# Patient Record
Sex: FEMALE | Race: WHITE | NOT HISPANIC OR LATINO | Employment: UNEMPLOYED | URBAN - METROPOLITAN AREA
[De-identification: names, ages, dates, MRNs, and addresses within clinical notes are randomized per-mention and may not be internally consistent; named-entity substitution may affect disease eponyms.]

---
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
## 2022-04-25 ENCOUNTER — GYNECOLOGY VISIT (OUTPATIENT)
Dept: OBGYN | Facility: CLINIC | Age: 22
End: 2022-04-25
Payer: COMMERCIAL

## 2022-04-25 VITALS
BODY MASS INDEX: 20.09 KG/M2 | HEIGHT: 64 IN | SYSTOLIC BLOOD PRESSURE: 92 MMHG | DIASTOLIC BLOOD PRESSURE: 60 MMHG | WEIGHT: 117.7 LBS

## 2022-04-25 DIAGNOSIS — N93.8 DUB (DYSFUNCTIONAL UTERINE BLEEDING): ICD-10-CM

## 2022-04-25 DIAGNOSIS — F12.90 MARIJUANA USE: ICD-10-CM

## 2022-04-25 DIAGNOSIS — O99.331 TOBACCO USE AFFECTING PREGNANCY IN FIRST TRIMESTER, ANTEPARTUM: ICD-10-CM

## 2022-04-25 PROBLEM — Z34.02 SUPERVISION OF NORMAL FIRST PREGNANCY IN SECOND TRIMESTER: Status: RESOLVED | Noted: 2020-06-10 | Resolved: 2022-04-25

## 2022-04-25 PROCEDURE — 99213 OFFICE O/P EST LOW 20 MIN: CPT | Performed by: PHYSICIAN ASSISTANT

## 2022-04-25 PROCEDURE — 76805 OB US >/= 14 WKS SNGL FETUS: CPT | Performed by: PHYSICIAN ASSISTANT

## 2022-04-25 RX ORDER — PNV NO.95/FERROUS FUM/FOLIC AC 28MG-0.8MG
1 TABLET ORAL DAILY
Qty: 90 TABLET | Refills: 5 | Status: SHIPPED | OUTPATIENT
Start: 2022-04-25

## 2022-04-25 NOTE — PROGRESS NOTES
"Subjective     Hope Stephani Mcintosh is a 21 y.o. female who presents with Gynecologic Exam (DUB)  Pt is somewhat of LMP and knows she had pos preg test by 1/15-. Dating is by LMP c/w US today - MICK 10/1/22 based on LMP.   OBHx: First pregnancy was  at 37w5d without complications, denies GDM, PIH or delivery complications.  PMHx: Denies  SHX: [place] teeth removal, no anestheic complications  Allergies: Kiwi and Latex  Social: Denies etoh use but smokes 4-5 cig/day, also occ vaping and smokes marijuana up to every other week, but not daily. Trying to quit.   Meds; Denies, will call in rx for PNV today  Pt currently denies cramping, bleeding or pain though pt had ~2 wk bleeding with mild cramping in early pregnancy only, none now. Possibly small FM.           HPI    Review of Systems   All other systems reviewed and are negative.             Objective     BP (!) 92/60   Ht 1.626 m (5' 4\")   Wt 53.4 kg (117 lb 11.2 oz)   LMP 2022 (Approximate)   BMI 20.20 kg/m²      Physical Exam  Vitals reviewed.   Constitutional:       Appearance: She is well-developed.   HENT:      Head: Normocephalic and atraumatic.   Eyes:      Pupils: Pupils are equal, round, and reactive to light.   Neck:      Thyroid: No thyromegaly.   Cardiovascular:      Rate and Rhythm: Normal rate and regular rhythm.      Heart sounds: Normal heart sounds.   Pulmonary:      Effort: Pulmonary effort is normal. No respiratory distress.      Breath sounds: Normal breath sounds.   Abdominal:      General: Bowel sounds are normal. There is no distension.      Palpations: Abdomen is soft.      Tenderness: There is no abdominal tenderness.   Genitourinary:     Exam position: Supine.      Uterus: Enlarged (Gravid, uterus c/w 15-17 wk size). Not deviated and not tender.    Musculoskeletal:      Cervical back: Normal range of motion and neck supple.   Skin:     General: Skin is warm and dry.      Findings: No erythema.   Neurological:      Mental " Paroxysmal atrial fibrilation  S/P ablation of atrial fibrillation  Current use of long term anticoagulation    Patient has a complicated history of recurrence of symptoms after ablations in 2015 and 2016.   - Per cardiology, increase Propafenone to 225 mg BID  - NPO at midnight for SAMMIE/DCCV in the morning  - Resume Dilitazem 120mg daily, Eliquis 5mg bid for anticoagulation  - Telemetry  - Mag & Phos pending.   Status: She is alert.      Deep Tendon Reflexes: Reflexes are normal and symmetric.   Psychiatric:         Behavior: Behavior normal.         Thought Content: Thought content normal.                BSUS done with single viable IUP, ~16w1d today, MICK 10/9/22. +cardiac activity at 144bpm, +FM. Difficult AC to assess due to fetal position.     MICK based on LMP 10/1/22, 17w2d, so will keep original MICK.              Assessment & Plan        1. DUB (dysfunctional uterine bleeding)  - F/u NOB visit 1-2 wks  - Bleeding precautions reviewed  - POCT Pregnancy    2. Tobacco use affecting pregnancy in first trimester, antepartum  - Risks d/w pt today, pt to try and quit    3. Marijuana use  - Risks d/w pt today

## 2022-04-25 NOTE — PROGRESS NOTES
Patient here for GYN/DUB  LMP: 1/25/2022  MICK: 11/1/2022  GA: 12w6d  Last pap: N/A  # [phone]  Pharmacy verified   Pt states having nausea.   Pregnancy test in clinic today positive

## 2022-05-09 ENCOUNTER — APPOINTMENT (OUTPATIENT)
Dept: OBGYN | Facility: CLINIC | Age: 22
End: 2022-05-09
Payer: COMMERCIAL

## 2022-05-09 ENCOUNTER — INITIAL PRENATAL (OUTPATIENT)
Dept: OBGYN | Facility: CLINIC | Age: 22
End: 2022-05-09
Payer: COMMERCIAL

## 2022-05-09 VITALS — DIASTOLIC BLOOD PRESSURE: 60 MMHG | SYSTOLIC BLOOD PRESSURE: 112 MMHG | WEIGHT: 120.7 LBS | BODY MASS INDEX: 20.72 KG/M2

## 2022-05-09 DIAGNOSIS — Z34.82 ENCOUNTER FOR SUPERVISION OF OTHER NORMAL PREGNANCY IN SECOND TRIMESTER: Primary | ICD-10-CM

## 2022-05-09 PROBLEM — Z34.92 ENCOUNTER FOR SUPERVISION OF NORMAL PREGNANCY IN SECOND TRIMESTER: Status: ACTIVE | Noted: 2022-05-09

## 2022-05-09 LAB
APPEARANCE UR: CLEAR
BILIRUB UR STRIP-MCNC: NORMAL MG/DL
COLOR UR AUTO: YELLOW
GLUCOSE UR STRIP.AUTO-MCNC: NORMAL MG/DL
KETONES UR STRIP.AUTO-MCNC: NEGATIVE MG/DL
LEUKOCYTE ESTERASE UR QL STRIP.AUTO: NEGATIVE
NITRITE UR QL STRIP.AUTO: NEGATIVE
PH UR STRIP.AUTO: 6 [PH] (ref 5–8)
PROT UR QL STRIP: NEGATIVE MG/DL
RBC UR QL AUTO: NEGATIVE
SP GR UR STRIP.AUTO: 1.03
UROBILINOGEN UR STRIP-MCNC: NORMAL MG/DL

## 2022-05-09 PROCEDURE — 0500F INITIAL PRENATAL CARE VISIT: CPT | Performed by: NURSE PRACTITIONER

## 2022-05-09 PROCEDURE — 81002 URINALYSIS NONAUTO W/O SCOPE: CPT | Performed by: NURSE PRACTITIONER

## 2022-05-09 ASSESSMENT — ENCOUNTER SYMPTOMS
CARDIOVASCULAR NEGATIVE: 1
PSYCHIATRIC NEGATIVE: 1
RESPIRATORY NEGATIVE: 1
NEUROLOGICAL NEGATIVE: 1
CONSTITUTIONAL NEGATIVE: 1
GASTROINTESTINAL NEGATIVE: 1
MUSCULOSKELETAL NEGATIVE: 1
EYES NEGATIVE: 1

## 2022-05-09 ASSESSMENT — EDINBURGH POSTNATAL DEPRESSION SCALE (EPDS)
I HAVE BEEN SO UNHAPPY THAT I HAVE HAD DIFFICULTY SLEEPING: NOT AT ALL
THE THOUGHT OF HARMING MYSELF HAS OCCURRED TO ME: NEVER
I HAVE FELT SCARED OR PANICKY FOR NO GOOD REASON: NO, NOT AT ALL
I HAVE BEEN SO UNHAPPY THAT I HAVE BEEN CRYING: NO, NEVER
I HAVE FELT SAD OR MISERABLE: NOT VERY OFTEN
TOTAL SCORE: 5
I HAVE BEEN ANXIOUS OR WORRIED FOR NO GOOD REASON: YES, SOMETIMES
I HAVE LOOKED FORWARD WITH ENJOYMENT TO THINGS: AS MUCH AS I EVER DID
I HAVE BEEN ABLE TO LAUGH AND SEE THE FUNNY SIDE OF THINGS: AS MUCH AS I ALWAYS COULD
I HAVE BLAMED MYSELF UNNECESSARILY WHEN THINGS WENT WRONG: NOT VERY OFTEN
THINGS HAVE BEEN GETTING ON TOP OF ME: NO, MOST OF THE TIME I HAVE COPED QUITE WELL

## 2022-05-09 NOTE — PROGRESS NOTES
NOB today  LMP: 01/25/2022  Last pap: not yet   Phone # [phone]  Pharmacy confirmed  On PNV  Cystic Fibrosis test offered.  Flu vaccine not being offered in clinic at the moment.  c/o

## 2022-05-09 NOTE — PROGRESS NOTES
Subjective     S:  Nena Mcintosh is a 21 y.o.  female  @ She is 18w1d with an MICK of Estimated Date of Delivery: 10/9/22 based off of US who presents for her new OB exam.      Her OB hx includes  x2 at term.   History of HSV I or II in self or partner: no  History of STIs in self or partner: no  History of Thyroid problems: no  History of marijuana use, understands recommendations for use in pregnancy.   History of tobacco use and vaping. Reports she is attempting to cut back.    No ER visits or previous care in this pregnancy. She has no complaints.  Declines AFP.  Declines CF.  Reports positive FM, no VB, LOF, or cramping.  Denies dysuria, vaginal DC.  Pt is  and lives with family. FOB is present and involved. She is currently working as homemaker, no heavy lifting, no chemical exposure.  Pregnancy is planned.    O:    Vitals:    22 1516   BP: 112/60   Weight: 54.7 kg (120 lb 11.2 oz)    See H&P Prenatal Physical.  Wet mount: not indictaed        FHTs: 150        Fundal ht: 18cm     A:   1.  IUP @ 18w1d MICK: Estimated Date of Delivery: 10/9/22 per US         2.  S=D        3.    Patient Active Problem List    Diagnosis Date Noted   • Encounter for supervision of normal pregnancy in second trimester 2022   • DUB (dysfunctional uterine bleeding) 2022   • Tobacco use affecting pregnancy in first trimester, antepartum 2022   • Marijuana use 2022   • History of anxiety and depression 2020   • Scoliosis 2014         P:  1.  GC/CT ordered. Pap deferred due to age.         2.  Prenatal labs and UDS ordered - lab slip given        3.  Discussed diet and exercise during pregnancy. Encouraged good nutrition, and daily exercise including walking or swimming. Discussed expected weight gain during pregnancy.              4.  Discussed adequate hydration during pregnancy.        5.  NOB packet given        6.  Return to office in 4 wks        7.   Complete OB US in 1-2 wks        8.  Pregnancy guide provided            HPI    Review of Systems   Constitutional: Negative.    HENT: Negative.    Eyes: Negative.    Respiratory: Negative.    Cardiovascular: Negative.    Gastrointestinal: Negative.    Genitourinary: Negative.    Musculoskeletal: Negative.    Skin: Negative.    Neurological: Negative.    Endo/Heme/Allergies: Negative.    Psychiatric/Behavioral: Negative.               Objective     /60   Wt 54.7 kg (120 lb 11.2 oz)   LMP 01/25/2022 (Approximate)   BMI 20.72 kg/m²      Physical Exam  Vitals and nursing note reviewed.   Constitutional:       Appearance: She is well-developed.   Cardiovascular:      Rate and Rhythm: Normal rate and regular rhythm.      Heart sounds: Normal heart sounds.   Pulmonary:      Effort: Pulmonary effort is normal.      Breath sounds: Normal breath sounds.   Abdominal:      Palpations: Abdomen is soft.   Genitourinary:     Vagina: Normal.      Comments: Uterus enlarged, c/w 18 wk ga  Musculoskeletal:         General: Normal range of motion.      Cervical back: Normal range of motion and neck supple.   Skin:     General: Skin is warm and dry.   Neurological:      Mental Status: She is alert and oriented to person, place, and time.      Deep Tendon Reflexes: Reflexes are normal and symmetric.   Psychiatric:         Behavior: Behavior normal.         Thought Content: Thought content normal.         Judgment: Judgment normal.                             Assessment & Plan        1. Encounter for supervision of other normal pregnancy in second trimester    - PRENATAL PANEL 3+HIV+HCV; Future  - URINALYSIS,CULTURE IF INDICATED; Future  - URINE DRUG SCREEN W/CONF (AR); Future  - US-OB 2ND 3RD TRI COMPLETE; Future  - Chlamydia/GC, PCR (Urine); Future  - POCT Urinalysis

## 2022-05-23 ENCOUNTER — APPOINTMENT (OUTPATIENT)
Dept: RADIOLOGY | Facility: IMAGING CENTER | Age: 22
End: 2022-05-23
Attending: NURSE PRACTITIONER
Payer: COMMERCIAL

## 2022-05-23 DIAGNOSIS — Z34.82 ENCOUNTER FOR SUPERVISION OF OTHER NORMAL PREGNANCY IN SECOND TRIMESTER: ICD-10-CM

## 2022-05-23 PROCEDURE — 76805 OB US >/= 14 WKS SNGL FETUS: CPT | Mod: TC | Performed by: NURSE PRACTITIONER

## 2022-06-01 PROBLEM — O43.122 VELAMENTOUS INSERTION OF UMBILICAL CORD IN SECOND TRIMESTER: Status: ACTIVE | Noted: 2022-06-01

## 2022-09-17 ENCOUNTER — ANESTHESIA EVENT (OUTPATIENT)
Dept: ANESTHESIOLOGY | Facility: MEDICAL CENTER | Age: 22
End: 2022-09-17
Payer: COMMERCIAL

## 2022-09-17 ENCOUNTER — ANESTHESIA (OUTPATIENT)
Dept: ANESTHESIOLOGY | Facility: MEDICAL CENTER | Age: 22
End: 2022-09-17
Payer: COMMERCIAL

## 2022-09-17 ENCOUNTER — HOSPITAL ENCOUNTER (INPATIENT)
Facility: MEDICAL CENTER | Age: 22
LOS: 2 days | End: 2022-09-19
Attending: OBSTETRICS & GYNECOLOGY | Admitting: OBSTETRICS & GYNECOLOGY
Payer: COMMERCIAL

## 2022-09-17 DIAGNOSIS — O43.122 VELAMENTOUS INSERTION OF UMBILICAL CORD IN SECOND TRIMESTER: ICD-10-CM

## 2022-09-17 DIAGNOSIS — M41.20 OTHER IDIOPATHIC SCOLIOSIS, UNSPECIFIED SPINAL REGION: ICD-10-CM

## 2022-09-17 DIAGNOSIS — F12.90 MARIJUANA USE: ICD-10-CM

## 2022-09-17 DIAGNOSIS — Z34.82 ENCOUNTER FOR SUPERVISION OF OTHER NORMAL PREGNANCY IN SECOND TRIMESTER: ICD-10-CM

## 2022-09-17 DIAGNOSIS — O99.331 TOBACCO USE AFFECTING PREGNANCY IN FIRST TRIMESTER, ANTEPARTUM: ICD-10-CM

## 2022-09-17 DIAGNOSIS — N93.8 DUB (DYSFUNCTIONAL UTERINE BLEEDING): ICD-10-CM

## 2022-09-17 DIAGNOSIS — Z86.59 HISTORY OF ANXIETY: ICD-10-CM

## 2022-09-17 PROBLEM — Z34.90 ENCOUNTER FOR INDUCTION OF LABOR: Status: ACTIVE | Noted: 2022-09-17

## 2022-09-17 LAB
ABO GROUP BLD: NORMAL
AMPHET UR QL SCN: NEGATIVE
APPEARANCE UR: ABNORMAL
BARBITURATES UR QL SCN: NEGATIVE
BASOPHILS # BLD AUTO: 0.5 % (ref 0–1.8)
BASOPHILS # BLD: 0.05 K/UL (ref 0–0.12)
BENZODIAZ UR QL SCN: NEGATIVE
BLD GP AB SCN SERPL QL: NORMAL
BZE UR QL SCN: NEGATIVE
CANNABINOIDS UR QL SCN: POSITIVE
COLOR UR AUTO: YELLOW
EOSINOPHIL # BLD AUTO: 0.08 K/UL (ref 0–0.51)
EOSINOPHIL NFR BLD: 0.8 % (ref 0–6.9)
ERYTHROCYTE [DISTWIDTH] IN BLOOD BY AUTOMATED COUNT: 51.8 FL (ref 35.9–50)
GLUCOSE UR QL STRIP.AUTO: NEGATIVE MG/DL
GP B STREP DNA SPEC QL NAA+PROBE: POSITIVE
HBV SURFACE AG SER QL: ABNORMAL
HCT VFR BLD AUTO: 41.4 % (ref 37–47)
HCV AB SER QL: ABNORMAL
HGB BLD-MCNC: 12.4 G/DL (ref 12–16)
HIV 1+2 AB+HIV1 P24 AG SERPL QL IA: NORMAL
IMM GRANULOCYTES # BLD AUTO: 0.12 K/UL (ref 0–0.11)
IMM GRANULOCYTES NFR BLD AUTO: 1.2 % (ref 0–0.9)
KETONES UR QL STRIP.AUTO: NEGATIVE MG/DL
LEUKOCYTE ESTERASE UR QL STRIP.AUTO: ABNORMAL
LYMPHOCYTES # BLD AUTO: 1.73 K/UL (ref 1–4.8)
LYMPHOCYTES NFR BLD: 17.5 % (ref 22–41)
MCH RBC QN AUTO: 23.3 PG (ref 27–33)
MCHC RBC AUTO-ENTMCNC: 30 G/DL (ref 33.6–35)
MCV RBC AUTO: 77.7 FL (ref 81.4–97.8)
METHADONE UR QL SCN: NEGATIVE
MONOCYTES # BLD AUTO: 0.69 K/UL (ref 0–0.85)
MONOCYTES NFR BLD AUTO: 7 % (ref 0–13.4)
NEUTROPHILS # BLD AUTO: 7.24 K/UL (ref 2–7.15)
NEUTROPHILS NFR BLD: 73 % (ref 44–72)
NITRITE UR QL STRIP.AUTO: NEGATIVE
NRBC # BLD AUTO: 0.02 K/UL
NRBC BLD-RTO: 0.2 /100 WBC
OPIATES UR QL SCN: NEGATIVE
OXYCODONE UR QL SCN: NEGATIVE
PCP UR QL SCN: NEGATIVE
PH UR STRIP.AUTO: 7 [PH] (ref 5–8)
PLATELET # BLD AUTO: 156 K/UL (ref 164–446)
PMV BLD AUTO: 10.8 FL (ref 9–12.9)
PROPOXYPH UR QL SCN: NEGATIVE
PROT UR QL STRIP: NEGATIVE MG/DL
RBC # BLD AUTO: 5.33 M/UL (ref 4.2–5.4)
RBC UR QL AUTO: NEGATIVE
RH BLD: NORMAL
RUBV AB SER QL: 208 IU/ML
SP GR UR STRIP.AUTO: 1.02 (ref 1–1.03)
T PALLIDUM AB SER QL IA: ABNORMAL
WBC # BLD AUTO: 9.9 K/UL (ref 4.8–10.8)

## 2022-09-17 PROCEDURE — 01967 NEURAXL LBR ANES VAG DLVR: CPT | Performed by: STUDENT IN AN ORGANIZED HEALTH CARE EDUCATION/TRAINING PROGRAM

## 2022-09-17 PROCEDURE — A9270 NON-COVERED ITEM OR SERVICE: HCPCS

## 2022-09-17 PROCEDURE — 87653 STREP B DNA AMP PROBE: CPT

## 2022-09-17 PROCEDURE — 770002 HCHG ROOM/CARE - OB PRIVATE (112)

## 2022-09-17 PROCEDURE — 86900 BLOOD TYPING SEROLOGIC ABO: CPT

## 2022-09-17 PROCEDURE — 700111 HCHG RX REV CODE 636 W/ 250 OVERRIDE (IP): Performed by: STUDENT IN AN ORGANIZED HEALTH CARE EDUCATION/TRAINING PROGRAM

## 2022-09-17 PROCEDURE — 86850 RBC ANTIBODY SCREEN: CPT

## 2022-09-17 PROCEDURE — 700102 HCHG RX REV CODE 250 W/ 637 OVERRIDE(OP)

## 2022-09-17 PROCEDURE — 36415 COLL VENOUS BLD VENIPUNCTURE: CPT

## 2022-09-17 PROCEDURE — 304965 HCHG RECOVERY SERVICES

## 2022-09-17 PROCEDURE — 700101 HCHG RX REV CODE 250: Performed by: STUDENT IN AN ORGANIZED HEALTH CARE EDUCATION/TRAINING PROGRAM

## 2022-09-17 PROCEDURE — 303615 HCHG EPIDURAL/SPINAL ANESTHESIA FOR LABOR

## 2022-09-17 PROCEDURE — 59409 OBSTETRICAL CARE: CPT

## 2022-09-17 PROCEDURE — 81002 URINALYSIS NONAUTO W/O SCOPE: CPT

## 2022-09-17 PROCEDURE — 87389 HIV-1 AG W/HIV-1&-2 AB AG IA: CPT

## 2022-09-17 PROCEDURE — 86592 SYPHILIS TEST NON-TREP QUAL: CPT

## 2022-09-17 PROCEDURE — 700111 HCHG RX REV CODE 636 W/ 250 OVERRIDE (IP): Performed by: OBSTETRICS & GYNECOLOGY

## 2022-09-17 PROCEDURE — 87340 HEPATITIS B SURFACE AG IA: CPT

## 2022-09-17 PROCEDURE — 85025 COMPLETE CBC W/AUTO DIFF WBC: CPT

## 2022-09-17 PROCEDURE — 86780 TREPONEMA PALLIDUM: CPT

## 2022-09-17 PROCEDURE — 86803 HEPATITIS C AB TEST: CPT

## 2022-09-17 PROCEDURE — 59410 OBSTETRICAL CARE: CPT | Performed by: OBSTETRICS & GYNECOLOGY

## 2022-09-17 PROCEDURE — 99283 EMERGENCY DEPT VISIT LOW MDM: CPT

## 2022-09-17 PROCEDURE — 80307 DRUG TEST PRSMV CHEM ANLYZR: CPT

## 2022-09-17 PROCEDURE — 700105 HCHG RX REV CODE 258: Performed by: OBSTETRICS & GYNECOLOGY

## 2022-09-17 PROCEDURE — 86762 RUBELLA ANTIBODY: CPT

## 2022-09-17 PROCEDURE — 86901 BLOOD TYPING SEROLOGIC RH(D): CPT

## 2022-09-17 RX ORDER — LIDOCAINE HYDROCHLORIDE AND EPINEPHRINE 15; 5 MG/ML; UG/ML
INJECTION, SOLUTION EPIDURAL
Status: COMPLETED | OUTPATIENT
Start: 2022-09-17 | End: 2022-09-17

## 2022-09-17 RX ORDER — DOCUSATE SODIUM 100 MG/1
100 CAPSULE, LIQUID FILLED ORAL 2 TIMES DAILY PRN
Status: DISCONTINUED | OUTPATIENT
Start: 2022-09-17 | End: 2022-09-19 | Stop reason: HOSPADM

## 2022-09-17 RX ORDER — SODIUM CHLORIDE, SODIUM LACTATE, POTASSIUM CHLORIDE, AND CALCIUM CHLORIDE .6; .31; .03; .02 G/100ML; G/100ML; G/100ML; G/100ML
1000 INJECTION, SOLUTION INTRAVENOUS
Status: DISCONTINUED | OUTPATIENT
Start: 2022-09-17 | End: 2022-09-17 | Stop reason: HOSPADM

## 2022-09-17 RX ORDER — SODIUM CHLORIDE, SODIUM LACTATE, POTASSIUM CHLORIDE, CALCIUM CHLORIDE 600; 310; 30; 20 MG/100ML; MG/100ML; MG/100ML; MG/100ML
INJECTION, SOLUTION INTRAVENOUS CONTINUOUS
Status: DISCONTINUED | OUTPATIENT
Start: 2022-09-17 | End: 2022-09-17

## 2022-09-17 RX ORDER — ROPIVACAINE HYDROCHLORIDE 2 MG/ML
INJECTION, SOLUTION EPIDURAL; INFILTRATION; PERINEURAL CONTINUOUS
Status: DISCONTINUED | OUTPATIENT
Start: 2022-09-17 | End: 2022-09-17

## 2022-09-17 RX ORDER — ACETAMINOPHEN 500 MG
1000 TABLET ORAL
Status: DISCONTINUED | OUTPATIENT
Start: 2022-09-17 | End: 2022-09-17 | Stop reason: HOSPADM

## 2022-09-17 RX ORDER — SODIUM CHLORIDE, SODIUM LACTATE, POTASSIUM CHLORIDE, CALCIUM CHLORIDE 600; 310; 30; 20 MG/100ML; MG/100ML; MG/100ML; MG/100ML
INJECTION, SOLUTION INTRAVENOUS PRN
Status: DISCONTINUED | OUTPATIENT
Start: 2022-09-17 | End: 2022-09-19 | Stop reason: HOSPADM

## 2022-09-17 RX ORDER — OXYTOCIN 10 [USP'U]/ML
10 INJECTION, SOLUTION INTRAMUSCULAR; INTRAVENOUS
Status: DISCONTINUED | OUTPATIENT
Start: 2022-09-17 | End: 2022-09-17 | Stop reason: HOSPADM

## 2022-09-17 RX ORDER — TERBUTALINE SULFATE 1 MG/ML
0.25 INJECTION, SOLUTION SUBCUTANEOUS
Status: DISCONTINUED | OUTPATIENT
Start: 2022-09-17 | End: 2022-09-17 | Stop reason: HOSPADM

## 2022-09-17 RX ORDER — ROPIVACAINE HYDROCHLORIDE 2 MG/ML
INJECTION, SOLUTION EPIDURAL; INFILTRATION
Status: COMPLETED | OUTPATIENT
Start: 2022-09-17 | End: 2022-09-17

## 2022-09-17 RX ORDER — ACETAMINOPHEN 500 MG
1000 TABLET ORAL EVERY 6 HOURS PRN
Status: DISCONTINUED | OUTPATIENT
Start: 2022-09-17 | End: 2022-09-19 | Stop reason: HOSPADM

## 2022-09-17 RX ORDER — IBUPROFEN 800 MG/1
800 TABLET ORAL EVERY 8 HOURS PRN
Status: DISCONTINUED | OUTPATIENT
Start: 2022-09-17 | End: 2022-09-19 | Stop reason: HOSPADM

## 2022-09-17 RX ORDER — IBUPROFEN 800 MG/1
800 TABLET ORAL
Status: DISCONTINUED | OUTPATIENT
Start: 2022-09-17 | End: 2022-09-17 | Stop reason: HOSPADM

## 2022-09-17 RX ORDER — SODIUM CHLORIDE, SODIUM LACTATE, POTASSIUM CHLORIDE, AND CALCIUM CHLORIDE .6; .31; .03; .02 G/100ML; G/100ML; G/100ML; G/100ML
250 INJECTION, SOLUTION INTRAVENOUS PRN
Status: DISCONTINUED | OUTPATIENT
Start: 2022-09-17 | End: 2022-09-17 | Stop reason: HOSPADM

## 2022-09-17 RX ORDER — LIDOCAINE HYDROCHLORIDE 10 MG/ML
20 INJECTION, SOLUTION INFILTRATION; PERINEURAL
Status: DISCONTINUED | OUTPATIENT
Start: 2022-09-17 | End: 2022-09-17 | Stop reason: HOSPADM

## 2022-09-17 RX ORDER — NICOTINE 21 MG/24HR
14 PATCH, TRANSDERMAL 24 HOURS TRANSDERMAL
Status: DISCONTINUED | OUTPATIENT
Start: 2022-09-17 | End: 2022-09-19 | Stop reason: HOSPADM

## 2022-09-17 RX ADMIN — OXYTOCIN 125 ML/HR: 10 INJECTION, SOLUTION INTRAMUSCULAR; INTRAVENOUS at 11:50

## 2022-09-17 RX ADMIN — NICOTINE 14 MG: 14 PATCH TRANSDERMAL at 13:48

## 2022-09-17 RX ADMIN — LIDOCAINE HYDROCHLORIDE,EPINEPHRINE BITARTRATE 3 ML: 15; .005 INJECTION, SOLUTION EPIDURAL; INFILTRATION; INTRACAUDAL; PERINEURAL at 09:45

## 2022-09-17 RX ADMIN — ROPIVACAINE HYDROCHLORIDE: 2 INJECTION, SOLUTION EPIDURAL; INFILTRATION at 09:46

## 2022-09-17 RX ADMIN — OXYTOCIN 2000 ML/HR: 10 INJECTION, SOLUTION INTRAMUSCULAR; INTRAVENOUS at 10:16

## 2022-09-17 RX ADMIN — IBUPROFEN 800 MG: 800 TABLET, FILM COATED ORAL at 13:50

## 2022-09-17 RX ADMIN — ROPIVACAINE HYDROCHLORIDE 8 ML: 5 INJECTION, SOLUTION EPIDURAL; INFILTRATION; PERINEURAL at 09:45

## 2022-09-17 RX ADMIN — IBUPROFEN 800 MG: 800 TABLET, FILM COATED ORAL at 22:16

## 2022-09-17 RX ADMIN — SODIUM CHLORIDE, POTASSIUM CHLORIDE, SODIUM LACTATE AND CALCIUM CHLORIDE: 600; 310; 30; 20 INJECTION, SOLUTION INTRAVENOUS at 09:30

## 2022-09-17 ASSESSMENT — LIFESTYLE VARIABLES
EVER HAD A DRINK FIRST THING IN THE MORNING TO STEADY YOUR NERVES TO GET RID OF A HANGOVER: NO
HAVE YOU EVER FELT YOU SHOULD CUT DOWN ON YOUR DRINKING: NO
EVER FELT BAD OR GUILTY ABOUT YOUR DRINKING: NO
TOTAL SCORE: 0
ALCOHOL_USE: NO
CONSUMPTION TOTAL: INCOMPLETE
TOTAL SCORE: 0
EVER_SMOKED: YES
TOTAL SCORE: 0
HAVE PEOPLE ANNOYED YOU BY CRITICIZING YOUR DRINKING: NO
DOES PATIENT WANT TO STOP DRINKING: CANNOT ASSESS

## 2022-09-17 ASSESSMENT — PATIENT HEALTH QUESTIONNAIRE - PHQ9
2. FEELING DOWN, DEPRESSED, IRRITABLE, OR HOPELESS: NOT AT ALL
SUM OF ALL RESPONSES TO PHQ9 QUESTIONS 1 AND 2: 0
1. LITTLE INTEREST OR PLEASURE IN DOING THINGS: NOT AT ALL

## 2022-09-17 ASSESSMENT — PAIN SCALES - GENERAL: PAIN_LEVEL: 0

## 2022-09-17 NOTE — CARE PLAN
The patient is Stable - Low risk of patient condition declining or worsening    Shift Goals  Clinical Goals: clinically stable    Progress made toward(s) clinical / shift goals:  Patient has been clinically stable.     Patient is not progressing towards the following goals:

## 2022-09-17 NOTE — L&D DELIVERY NOTE
Vaginal Delivery Procedure Note:    PreDelivery Diagnosis:  1. SIUP @ 36w6d  2. Poor PNC, no PNL  3. GBS unknown    PostDelivery Diagnosis:  1. S/p     Procedure in Detail:  Nena sarkar a Nena Mcintosh is a 22 y.o. , admitted for active labor.  Her labor course was not augmented and pushing dorsal lithotomy position.  Head delivered easily and restituted towards maternal JOSÉ MIGUEL.  Anterior shoulder followed easily with gentle downwards pressure followed by the posterior shoulder and body.  Male infant delivered @ 1009 on 2022.  There was no nuchal cord.  Infant was placed on the maternal abdomen and was stimulated and bulb suctioned.  Cord clamping was delayed x60 seconds then the cord was clamped x2 and cut.  Infant APGARs 8 and 8 and 1 and 5 minutes, respectively. Birth weight 2948g. Cord gases were sent.  IV pitocin bolus started.  Placenta delivered spontaneously intact at 1016. 3 Vessel cord.  The vagina and perineum were examined revealing no tears.  The uterus was firm with IV pitocin and fundal massage.  Pt and infant left bonding in LDR.      Anesthesia - None  Sponge and needle counts correct.  Patient tolerated procedure well.    Anticipate routine postparum care.    Trini Riggs M.D., PGY1  [employer], Family Medicine Resident

## 2022-09-17 NOTE — PROGRESS NOTES
Patient admitted to the floor. VSS. Bleeding light and fundus firm. Patient orientated to the room. Infant in nursing  being monitored. Called and then messaged Dr. Riggs, due to no orders. Notified her that patient has no orders and is requesting the nicotine patch. Patient is a daily smoker.

## 2022-09-17 NOTE — ED PROVIDER NOTES
LABOR AND DELIVERY TRIAGE NOTE    PATIENT ID:  NAME:  Nena Mcintosh  MRN:               [id number]  YOB: 2000    Subjective: Nena is a  22 y.o. female  at 36w6d with no MICK due to no prenatal care including labs and imaging presents today in active labor. Per patient- pregnancy has been uncomplicated.    positive for contractions  negative pain   negative for LOF  negative for vaginal bleeding  positive for fetal movement    PNL:  Unknown Blood Type , Rubella, HIV, TrepAb, HBsAg, and GC/CT status - pending  1 HR GTT: never performed  GBS unknown    ROS: Patient denies any fever chills, nausea, vomiting, headache, chest pain, shortness of breath, or dysuria or unusual swelling of hands or feet.     PMHx:   Past Medical History:   Diagnosis Date    Allergy     Anxiety         OB History    Para Term  AB Living   2 1 1     1   SAB IAB Ectopic Molar Multiple Live Births             1      # Outcome Date GA Lbr Sinan/2nd Weight Sex Delivery Anes PTL Lv   2 Current            1 Term 10/03/20 37w5d  2.948 kg (6 lb 8 oz) F Vag-Spont EPI N MAMADOU      Birth Comments: Pt states baby was born with heart murmur     GYN: denies STIs, no cervical procedures.    PSHx:  Past Surgical History:   Procedure Laterality Date    DENTAL SURGERY  2018    wisdom teeth, tooth reposition     Social Hx:  Social History     Tobacco Use    Smoking status: Every Day     Years: 13.00     Types: Cigarettes    Smokeless tobacco: Never    Tobacco comments:     Pt states smoking 2-3 cig/day    Vaping Use    Vaping Use: Some days   Substance Use Topics    Alcohol use: No    Drug use: Yes     Types: Marijuana     Comment: smoking occasionally      Medications:  No current facility-administered medications on file prior to encounter.     Current Outpatient Medications on File Prior to Encounter   Medication Sig Dispense Refill    Prenatal Vit-Fe Fumarate-FA (PRENATAL VITAMINS) 28-0.8 MG Tab Take 1 Tablet by mouth every  "day. 90 Tablet 5    Prenatal MV-Min-Fe Fum-FA-DHA (PRENATAL 1 PO) Take  by mouth.       Allergies:   Allergies   Allergen Reactions    Food Swelling     Pt states when she eats kiwi her throat get tingly     Latex      Pt states gets a rash and swollen     Objective:    Vitals:    22 0740 22 0741 22 0807   BP: 110/77     Pulse: (!) 49 (!) 56    Resp:   18   Temp: 36.6 °C (97.9 °F)     TempSrc: Temporal     SpO2:  97%    Weight: 61.7 kg (136 lb)     Height: 1.626 m (5' 4\")       Temp (24hrs), Av.6 °C (97.9 °F), Min:36.6 °C (97.9 °F), Max:36.6 °C (97.9 °F)    General: No acute distress, resting comfortably in bed.  HEENT: normocephalic, nontraumatic, PERRLA, EOMI  Cardiovascular: Heart RRR with no murmurs, rubs or gallops. Distal Pulses 2+  Respiratory: symmetric chest expansion, lungs CTAB, with no wheezes, rales, rhonci  Abdomen: gravid, nontender  Musculoskeletal: LI spontaneously  Neuro: non focal with no numbness, tingling or changes in sensation    Cervix:  4-5cm/70%/-2  [place]: Uterine Contractions  minutes.   FHRM: Baseline 130-140, moderate variability, + accels, no decels    Labs:   Results for orders placed or performed during the hospital encounter of 22   POCT urinalysis device results   Result Value Ref Range    POC Color Yellow     POC Appearance Slightly Cloudy (A)     POC Glucose Negative Negative mg/dL    POC Ketones Negative Negative mg/dL    POC Specific Gravity 1.020 1.005 - 1.030    POC Blood Negative Negative    POC Urine PH 7.0 5.0 - 8.0    POC Protein Negative Negative mg/dL    POC Nitrites Negative Negative    POC Leukocyte Esterase Small (A) Negative     Assessment: Nena is a  22 y.o. female  at 36w6d with no MICK due to no prenatal care including labs and imaging presents today in active labor.     Plan:   Admit to L&D for active labor  - No PNC   - GBS unknown   - Epidural anesthesia- if desired  - Reactive NSTs, continue FHM tracings  - Consider Pitocin " and/or AROM when appropriate    Discussed case with Dr. Kassy Lou, [facility] Attending. Case was discussed and attending agreed with plan prior to admit of patient.    Trini Riggs M.D. , PGY1  [employer], Family Medicine Resident

## 2022-09-17 NOTE — PROGRESS NOTES
Pt presents to L&D with complaints of contractions. Pt ambulated to [facility] for assessment.     0740 TOCO and EFM applied, VSS. Pt reports +FM, denies LOF or VB. Pt states her contractions started last night but couldn't really state what time they started. Pt has only had one prenatal visit this pregnancy, pt denies getting any additional prenatal care outside of [facility]. SVE 4-5/70/-2 but very posterior. RN will update Dr. Lou on pt status. Pt requesting an epidural. Pt updated that since she is  and due to the positioning of her cervix, we may reevaluate before admission to make sure she's in labor. Pt verbalized understanding.     0800 Dr. Lou updated on pt status, orders to recheck SVE at the hour wero. If pt to be admitted, orders for prenatal labs and GBS.     0840 RN at bedside, SVE 5-6/70/-2. GBS collected. RN will update Dr. Lou ASAP. Pt increasingly uncomfortable, RN will transfer pt to S2 for admission.     0915 Report given to Denita TOLEDO, POC discussed.

## 2022-09-17 NOTE — ANESTHESIA TIME REPORT
Anesthesia Start and Stop Event Times     Date Time Event    9/17/2022 0930 Ready for Procedure     0935 Anesthesia Start     1009 Anesthesia Stop        Responsible Staff  09/17/22    Name Role Begin End    Regi Be M.D. Anesth 0935 1009        Overtime Reason:  no overtime (within assigned shift)    Comments:

## 2022-09-17 NOTE — ANESTHESIA PREPROCEDURE EVALUATION
Date: 22  Procedure: Labor Epidural       21 yo woman  at 36w6d in labor.  Pt reports uncomplicated pregnancy but has had minimal prenatal care. No known medical conditions.    Has had epidural in the past and tolerated it well.     Relevant Problems   NEURO   (positive) History of anxiety and depression      CARDIAC   (positive) Velamentous insertion of umbilical cord in second trimester       Physical Exam    Airway   Mallampati: II  TM distance: >3 FB  Neck ROM: full       Cardiovascular - normal exam  Rhythm: regular  Rate: normal  (-) murmur     Dental - normal exam           Pulmonary - normal exam  Breath sounds clear to auscultation     Abdominal    Neurological - normal exam                 Anesthesia Plan    ASA 2       Plan - epidural   Neuraxial block will be labor analgesia            Induction: intravenous    Postoperative Plan: Postoperative administration of opioids is intended.    Pertinent diagnostic labs and testing reviewed    Informed Consent:    Anesthetic plan and risks discussed with patient.    Use of blood products discussed with: patient whom consented to blood products.

## 2022-09-17 NOTE — ANESTHESIA POSTPROCEDURE EVALUATION
Patient: Nena Mcintosh    Procedure Summary     Date: 09/17/22 Room / Location:     Anesthesia Start: 0935 Anesthesia Stop: 1009    Procedure: Labor Epidural Diagnosis:     Scheduled Providers:  Responsible Provider: Regi Be M.D.    Anesthesia Type: epidural ASA Status: 2          Final Anesthesia Type: epidural  Last vitals  BP   Blood Pressure: 124/79    Temp   36.6 °C (97.9 °F)    Pulse   70   Resp   18    SpO2   100 %      Anesthesia Post Evaluation    Patient location during evaluation: PACU  Patient participation: complete - patient participated  Level of consciousness: awake and alert  Pain score: 0    Airway patency: patent  Anesthetic complications: no  Cardiovascular status: hemodynamically stable  Respiratory status: acceptable  Hydration status: euvolemic    PONV: none          No notable events documented.

## 2022-09-17 NOTE — ANESTHESIA PROCEDURE NOTES
Epidural Block    Date/Time: 9/17/2022 9:45 AM  Performed by: Regi Be M.D.  Authorized by: Regi eB M.D.     Patient Location:  OB  Start Time:  9/17/2022 9:45 AM  End Time:  9/17/2022 9:50 AM  Reason for Block: labor analgesia    patient identified, IV checked, site marked, risks and benefits discussed, surgical consent, monitors and equipment checked, pre-op evaluation and timeout performed    Patient Position:  Sitting  Prep: ChloraPrep, patient draped and sterile technique    Monitoring:  Blood pressure, continuous pulse oximetry and heart rate  Approach:  Midline  Location:  L3-L4  Injection Technique:  SANA saline  Skin infiltration:  Lidocaine  Strength:  1%  Dose:  3ml  Needle Type:  Tuohy  Needle Gauge:  17 G  Needle Length:  3.5 in  Loss of resistance::  4  Catheter Size:  19 G  Catheter at Skin Depth:  10  Test Dose Result:  Negative   Easy placement. Attempt x1.

## 2022-09-18 LAB
ANISOCYTOSIS BLD QL SMEAR: ABNORMAL
BASOPHILS # BLD AUTO: 0.5 % (ref 0–1.8)
BASOPHILS # BLD: 0.06 K/UL (ref 0–0.12)
COMMENT 1642: NORMAL
EOSINOPHIL # BLD AUTO: 0.14 K/UL (ref 0–0.51)
EOSINOPHIL NFR BLD: 1.1 % (ref 0–6.9)
ERYTHROCYTE [DISTWIDTH] IN BLOOD BY AUTOMATED COUNT: 51.9 FL (ref 35.9–50)
HCT VFR BLD AUTO: 34.3 % (ref 37–47)
HGB BLD-MCNC: 10.1 G/DL (ref 12–16)
IMM GRANULOCYTES # BLD AUTO: 0.15 K/UL (ref 0–0.11)
IMM GRANULOCYTES NFR BLD AUTO: 1.2 % (ref 0–0.9)
LYMPHOCYTES # BLD AUTO: 1.95 K/UL (ref 1–4.8)
LYMPHOCYTES NFR BLD: 15.9 % (ref 22–41)
MACROCYTES BLD QL SMEAR: ABNORMAL
MCH RBC QN AUTO: 22.9 PG (ref 27–33)
MCHC RBC AUTO-ENTMCNC: 29.4 G/DL (ref 33.6–35)
MCV RBC AUTO: 77.6 FL (ref 81.4–97.8)
MICROCYTES BLD QL SMEAR: ABNORMAL
MONOCYTES # BLD AUTO: 0.92 K/UL (ref 0–0.85)
MONOCYTES NFR BLD AUTO: 7.5 % (ref 0–13.4)
MORPHOLOGY BLD-IMP: NORMAL
NEUTROPHILS # BLD AUTO: 9.05 K/UL (ref 2–7.15)
NEUTROPHILS NFR BLD: 73.8 % (ref 44–72)
NRBC # BLD AUTO: 0 K/UL
NRBC BLD-RTO: 0 /100 WBC
PLATELET # BLD AUTO: 185 K/UL (ref 164–446)
PLATELET BLD QL SMEAR: NORMAL
PMV BLD AUTO: 11.8 FL (ref 9–12.9)
POLYCHROMASIA BLD QL SMEAR: NORMAL
RBC # BLD AUTO: 4.42 M/UL (ref 4.2–5.4)
RBC BLD AUTO: PRESENT
WBC # BLD AUTO: 12.3 K/UL (ref 4.8–10.8)

## 2022-09-18 PROCEDURE — 85025 COMPLETE CBC W/AUTO DIFF WBC: CPT

## 2022-09-18 PROCEDURE — 770002 HCHG ROOM/CARE - OB PRIVATE (112)

## 2022-09-18 PROCEDURE — 36415 COLL VENOUS BLD VENIPUNCTURE: CPT

## 2022-09-18 ASSESSMENT — EDINBURGH POSTNATAL DEPRESSION SCALE (EPDS)
I HAVE FELT SAD OR MISERABLE: NO, NOT AT ALL
I HAVE FELT SCARED OR PANICKY FOR NO GOOD REASON: NO, NOT AT ALL
I HAVE BEEN SO UNHAPPY THAT I HAVE BEEN CRYING: NO, NEVER
I HAVE LOOKED FORWARD WITH ENJOYMENT TO THINGS: AS MUCH AS I EVER DID
I HAVE BEEN ABLE TO LAUGH AND SEE THE FUNNY SIDE OF THINGS: AS MUCH AS I ALWAYS COULD
THE THOUGHT OF HARMING MYSELF HAS OCCURRED TO ME: NEVER
I HAVE BEEN SO UNHAPPY THAT I HAVE HAD DIFFICULTY SLEEPING: NOT AT ALL
I HAVE BLAMED MYSELF UNNECESSARILY WHEN THINGS WENT WRONG: YES, SOME OF THE TIME
THINGS HAVE BEEN GETTING ON TOP OF ME: NO, MOST OF THE TIME I HAVE COPED QUITE WELL
I HAVE BEEN ANXIOUS OR WORRIED FOR NO GOOD REASON: HARDLY EVER

## 2022-09-18 NOTE — PROGRESS NOTES
Obstetrics & Gynecology Post-Delivery Progress Note    Date of Service: 2022    22 y.o.  s/p vaginal, spontaneous  Delivery date: 2022    Subjective  Patient reports she is feeling well this morning and slept well overnight. Patient is tolerate oral intake well and ambulating independently during visit. Patient states she is voiding/stooling appropriately. Currently attempting breastfeeding with success and supplementing with formula. Patient has no new concerns this morning    Pain: Well controlled  Bleeding: lochia minimal  Tolerating PO: yes  Voiding: without difficulty  Ambulating: yes  Passing flatus: Yes  Feeding:  Breastfeeding and bottle feeding with formula     Objective  24hr VS:  Temp:  [36.2 °C (97.1 °F)-36.7 °C (98 °F)] 36.4 °C (97.6 °F)  Pulse:  [44-96] 63  Resp:  [15-18] 18  BP: (102-133)/(59-92) 102/63  SpO2:  [96 %-100 %] 96 %    Physical Exam  Gen: well appearing, no apparent distress, resting comfortably in bed  CV: rrr, no m/r/g  Resp: CTAB, symmetric breath sounds  Abd: soft, nontender, nondistended  Fundus: firm and at umbilicus  Incision: not applicable, (vaginal delivery)  Ext: symmetric, no peripheral edema, calves nontender    Labs:  Results for orders placed or performed during the hospital encounter of 22   URINE DRUG SCREEN   Result Value Ref Range    Amphetamines Urine Negative Negative    Barbiturates Negative Negative    Benzodiazepines Negative Negative    Cocaine Metabolite Negative Negative    Methadone Negative Negative    Opiates Negative Negative    Oxycodone Negative Negative    Phencyclidine -Pcp Negative Negative    Propoxyphene Negative Negative    Cannabinoid Metab Positive (A) Negative   PRENATAL PANEL 3+HIV+HCV   Result Value Ref Range    WBC 9.9 4.8 - 10.8 K/uL    RBC 5.33 4.20 - 5.40 M/uL    Hemoglobin 12.4 12.0 - 16.0 g/dL    Hematocrit 41.4 37.0 - 47.0 %    MCV 77.7 (L) 81.4 - 97.8 fL    MCH 23.3 (L) 27.0 - 33.0 pg    MCHC 30.0 (L) 33.6 - 35.0  g/dL    RDW 51.8 (H) 35.9 - 50.0 fL    Platelet Count 156 (L) 164 - 446 K/uL    MPV 10.8 9.0 - 12.9 fL    Neutrophils-Polys 73.00 (H) 44.00 - 72.00 %    Lymphocytes 17.50 (L) 22.00 - 41.00 %    Monocytes 7.00 0.00 - 13.40 %    Eosinophils 0.80 0.00 - 6.90 %    Basophils 0.50 0.00 - 1.80 %    Immature Granulocytes 1.20 (H) 0.00 - 0.90 %    Nucleated RBC 0.20 /100 WBC    Neutrophils (Absolute) 7.24 (H) 2.00 - 7.15 K/uL    Lymphs (Absolute) 1.73 1.00 - 4.80 K/uL    Monos (Absolute) 0.69 0.00 - 0.85 K/uL    Eos (Absolute) 0.08 0.00 - 0.51 K/uL    Baso (Absolute) 0.05 0.00 - 0.12 K/uL    Immature Granulocytes (abs) 0.12 (H) 0.00 - 0.11 K/uL    NRBC (Absolute) 0.02 K/uL    Rubella IgG Antibody 208.00 IU/mL    Hepatitis B Surface Antigen Non-Reactive Non-Reactive    Hepatitis C Antibody Non-Reactive Non-Reactive    Syphilis, Treponemal Qual Non-Reactive Non-Reactive   GRP B STREP, BY PCR (DIRECT)    Specimen: Vaginal; Genital   Result Value Ref Range    Strep Gp B DNA PCR POSITIVE (A) Negative   HIV AG/AB COMBO ASSAY SCREENING   Result Value Ref Range    HIV Ag/Ab Combo Assay Non-Reactive Non Reactive   OP Prenatal Panel-Blood Bank   Result Value Ref Range    ABO Grouping Only O     Rh Grouping Only POS     Antibody Screen Scrn NEG    CBC WITH DIFFERENTIAL   Result Value Ref Range    WBC 12.3 (H) 4.8 - 10.8 K/uL    RBC 4.42 4.20 - 5.40 M/uL    Hemoglobin 10.1 (L) 12.0 - 16.0 g/dL    Hematocrit 34.3 (L) 37.0 - 47.0 %    MCV 77.6 (L) 81.4 - 97.8 fL    MCH 22.9 (L) 27.0 - 33.0 pg    MCHC 29.4 (L) 33.6 - 35.0 g/dL    RDW 51.9 (H) 35.9 - 50.0 fL    Platelet Count 185 164 - 446 K/uL    MPV 11.8 9.0 - 12.9 fL    Nucleated RBC 0.00 /100 WBC    NRBC (Absolute) 0.00 K/uL   POCT urinalysis device results   Result Value Ref Range    POC Color Yellow     POC Appearance Slightly Cloudy (A)     POC Glucose Negative Negative mg/dL    POC Ketones Negative Negative mg/dL    POC Specific Gravity 1.020 1.005 - 1.030    POC Blood Negative  Negative    POC Urine PH 7.0 5.0 - 8.0    POC Protein Negative Negative mg/dL    POC Nitrites Negative Negative    POC Leukocyte Esterase Small (A) Negative     Medications  nicotine, 14 mg, Transdermal, Daily-0600      PRN medications: LR, docusate sodium, ibuprofen, acetaminophen, tetanus-dipth-acell pertussis    Assessment/Plan  Nena Mcintosh is a 22 y.o.yo  postpartum day #1  s/p vaginal, spontaneous    - Post care: meeting all goals  - Pain: controlled  - Rh+, Rubella Immune  - Method of Feeding: plans to breastfeed, plans to bottle feed  - Method of Contraception: nothing  VTE prophylaxis: none indicated    - Disposition: Anticipate discharge home on PPD2     Trini Riggs M.D., PGY1  [employer], Family Medicine Resident

## 2022-09-18 NOTE — DISCHARGE PLANNING
Discharge Planning Assessment Post Partum    Completed chart review and discussed with RN. Met with mother Nena and father Wade at bedside    Reason for Referral: 1 prenatal visit. THC use. Mother and infant positive UDS for cannabinoid. Per [facility] mother has another child who is in custody of [facility] and plan is to terminate her parental rights.  Address: [address] [place] [address] [place]  Type of Living Situation: [facility]  Mom Diagnosis: post partum  Baby Diagnosis:   Primary Language: english    Father of the Baby: Wade Mcdonald  Involved in baby’s care? yes  Contact Information: [phone]    Prenatal Care: 1 visit  Mom's PCP: mother unsure  PCP for new baby:mother unsure - [facility] following     Support System: very limited.   Coping/Bonding between mother & baby: appropriate per RN  Source of Feeding: breast and bottle  Supplies for Infant: limited. They have a car seat. Father states he will go to [employer] and obtain a pack n play for infant.     Mom's Insurance: [employer]   Baby Covered on Insurance:will add  Mother Employed/School: no  Father Employed: [employer]  Other children in the home/names & ages: no other children with parents. Mother states other child placed with her grandmother.     Financial Hardship/Income: yes - in shelter   Mom's Mental status: alert and oriented  Services used prior to admit: WIC, Medicaid, [facility]amps[facility]    CPS History: yes - custody of other child who is placed with grandmother. Call to [facility] Sindy and reported birth of infant and positive marijuana UDS. [facility] will be sending worker to meet with parents - they have a open case currently.  Psychiatric History: denies  Domestic Violence History: denies  Drug/ETOH History: yes - history of methamphetamine per [facility]. Mother denies use other than marijuana use regularly. Parents aware of positive UDS and report to [facility].     Resources Provided: community resources, [facility]per Built In, [facility], [facility]  support, [facility]  Referrals Made: diaper bank.     Clearance for Discharge: Infant is not cleared to discharge to parents [facility]     Ongoing Plan:Await plan by [facility] regarding safe discharge plan.

## 2022-09-18 NOTE — DISCHARGE PLANNING
[facility] worker Kd Hunter here to see parents. Provided information from my assessment. Records sent via secure email. Worker aware mother and infant are not medically cleared today for discharge.     Will report off to PP  tomorrow to follow for discharge plan. Infant is not cleared to discharge to parents at this time.

## 2022-09-18 NOTE — PROGRESS NOTES
Report received from Sayra TOLEDO. Pt assessment complete. Reviewed POC for this evening. Pt requesting motrin; given. Pt plans to bottle feed and breastfeed baby. Advised pt to call for assistance with latching. Call light is within reach.    Pt stated nicotine patch was falling off. When I checked patch it fell into my hand. Nicotine patch disposed of. Pt does not want another patch placed.

## 2022-09-18 NOTE — PROGRESS NOTES
Patient and father of the infant back in nursery to get the baby. They plan on waiting in the nursery until hearing screen is complete.

## 2022-09-18 NOTE — CARE PLAN
Problem: Knowledge Deficit - Postpartum  Goal: Patient will verbalize and demonstrate understanding of self and infant care  Outcome: Progressing     Problem: Psychosocial - Postpartum  Goal: Patient will verbalize and demonstrate effective bonding and parenting behavior  Outcome: Progressing     Problem: Altered Physiologic Condition  Goal: Patient physiologically stable as evidenced by normal lochia, palpable uterine involution and vitals within normal limits  Outcome: Progressing   The patient is Stable - Low risk of patient condition declining or worsening    Shift Goals  Clinical Goals: maintain tolerable pain level    Progress made toward(s) clinical / shift goals:  pt pain has been managed with PRN motrin. Pt calls for medication as needed. Pt has been caring for self and baby. Pt lochia is light rubra; reviewed lochia changes, including heavy bleeding, with pt.     Patient is not progressing towards the following goals:

## 2022-09-18 NOTE — LACTATION NOTE
This note was copied from a baby's chart.  Mother's second baby, attempted to breast feed first baby but reports baby needed more calories so she switched to formula. Current baby born late , following LPI feeding plan with formula supplementation. Reports she has been able to latch her baby independently and declines assistance with breastfeeding. Pump settings, milk collection and storage, and washing of pump parts reviewed. Provided handout on Late  Infants and reviewed supplemental feeding volume guidelines. Plan to offer breast for up to 10 minutes, then supplement with EBM/formula, then double pump breasts - repeat all 3 steps every 3 hours or sooner for hunger cues. +THC screening, mother educated that all substances she puts into her body can transfer into her breast milk and use of THC during breastfeeding is not recommended, she voices understanding. Established with WIC for follow-up support, encouraged to call to request rental pump tomorrow morning. Denies questions/concerns.

## 2022-09-19 ENCOUNTER — PHARMACY VISIT (OUTPATIENT)
Dept: PHARMACY | Facility: MEDICAL CENTER | Age: 22
End: 2022-09-19
Payer: MEDICARE

## 2022-09-19 VITALS
HEIGHT: 64 IN | SYSTOLIC BLOOD PRESSURE: 104 MMHG | WEIGHT: 138.89 LBS | HEART RATE: 60 BPM | RESPIRATION RATE: 18 BRPM | OXYGEN SATURATION: 99 % | BODY MASS INDEX: 23.71 KG/M2 | TEMPERATURE: 98.9 F | DIASTOLIC BLOOD PRESSURE: 69 MMHG

## 2022-09-19 PROCEDURE — 700102 HCHG RX REV CODE 250 W/ 637 OVERRIDE(OP)

## 2022-09-19 PROCEDURE — RXMED WILLOW AMBULATORY MEDICATION CHARGE: Performed by: STUDENT IN AN ORGANIZED HEALTH CARE EDUCATION/TRAINING PROGRAM

## 2022-09-19 PROCEDURE — A9270 NON-COVERED ITEM OR SERVICE: HCPCS

## 2022-09-19 RX ORDER — PSEUDOEPHEDRINE HCL 30 MG
100 TABLET ORAL 2 TIMES DAILY PRN
Qty: 60 CAPSULE | COMMUNITY
Start: 2022-09-19

## 2022-09-19 RX ORDER — IBUPROFEN 800 MG/1
800 TABLET ORAL EVERY 8 HOURS PRN
Qty: 30 TABLET | Refills: 0 | Status: SHIPPED | OUTPATIENT
Start: 2022-09-19

## 2022-09-19 RX ORDER — ACETAMINOPHEN 500 MG
1000 TABLET ORAL EVERY 6 HOURS PRN
Qty: 30 TABLET | Refills: 0 | COMMUNITY
Start: 2022-09-19

## 2022-09-19 RX ADMIN — NICOTINE 14 MG: 14 PATCH TRANSDERMAL at 06:28

## 2022-09-19 NOTE — PROGRESS NOTES
Received report from TRE Colbert. Patient in bed, declines pain at this time. Patient states she will call when needing pain medication. Whiteboards updated, POC discussed. Call light within reach. Patient encouraged to call with any needs and or concerns.

## 2022-09-19 NOTE — DISCHARGE INSTRUCTIONS
Pelvic rest x6 weeks  Return for follow up visit in 6 weeks.  Call or come to ED for: heavy vaginal bleeding, fever >100.4, severe abdominal pain, severe headache, chest pain, shortness of breath,  N/V, foul-smelling vaginal discharge, or other concerns.    PATIENT DISCHARGE EDUCATION INSTRUCTION SHEET    REASONS TO CALL YOUR OBSTETRICIAN  Persistent fever, shaking, chills (Temperature higher than 100.4) may indicate you have an infection  Heavy bleeding: soaking more than 1 pad per hour; Passing clots an egg-sized clot or bigger may mean you have an postpartum hemorrhage  Foul odor from vagina or bad smelling or discolored discharge or blood  Breast infection (Mastitis symptoms); breast pain, chills, fever, redness or red streaks, may feel flu like symptoms  Urinary pain, burning or frequency  Incision that is not healing, increased redness, swelling, tenderness or pain, or any pus from episiotomy or  site may mean you have an infection  Redness, swelling, warmth, or painful to touch in the calf area of your leg may mean you have a blood clot  Severe or intensified depression, thoughts or feelings of wanting to hurt yourself or someone else   Pain in chest, obstructed breathing or shortness of breath (trouble catching your breath) may mean you are having a postpartum complication. Call your provider immediately   Headache that does not get better, even after taking medicine, a bad headache with vision changes or pain in the upper right area of your belly may mean you have high blood pressure or post birth preeclampsia. Call your provider immediately    HAND WASHING  All family and friends should wash their hands:  Before and after holding the baby  Before feeding the baby  After using the restroom or changing the baby's diaper    WOUND CARE  Ask your physician for additional care instructions. In general:  Episiotomy/Laceration  May use donavon-spray bottle, witch hazel pads and dermaplast spray for  comfort  Use donavon-spray bottle after urinating to cleanse perineal area  To prevent burning during urination spray donavon-water bottle on labial area   Pat perineal area dry until episiotomy/laceration is healed  Continue to use donavon-bottle until bleeding stops as needed  If have a 2nd degree laceration or greater, a Sitz bath can offer relief from soreness, burning, and inflammation   Sitz Bath   Sit in 6 inches of warm water and soak laceration as needed until the laceration heals    VAGINAL CARE AND BLEEDING  Nothing inside vagina for 6 weeks:   No sexual intercourse, tampons or douching  Bleeding may continue for 2-4 weeks. Amount and color may vary  Soaking 1 pad or more in an hour for several hours is considered heavy bleeding  Passing large egg sized blood clots can be concerning  If you feel like you have heavy bleeding or are having increasing amount of blood clots call your Obstetrician immediately  If you begin feeling faint upon standing, feeling sick to your stomach, have clammy skin, a really fast heartbeat, have chills, start feeling confused, dizzy, sleepy or weak, or feeling like you're going to faint call your Obstetrician immediately    HYPERTENSION   Preeclampsia or gestational hypertension are types of high blood pressure that only pregnant women can get. It is important for you to be aware of symptoms to seek early intervention and treatment. If you have any of these symptoms immediately call your Obstetrician    Vision changes or blurred vision   Severe headache or pain that is unrelieved with medication and will not go away  Persistent pain in upper abdomen or shoulder   Increased swelling of face, feet, or hands  Difficulty breathing or shortness of breath at rest  Urinating less than usual    URINATION AND BOWEL MOVEMENTS  Eating more fiber (bran cereal, fruits, and vegetables) and drinking plenty of fluids will help to avoid constipation  Urinary frequency and urgency after childbirth is  "normal  If you experience any urinary pain, burning or frequency call your provider    BIRTH CONTROL  It is possible to become pregnant at any time after delivery and while breastfeeding  Plan to discuss a method of birth control with your physician at your post delivery follow up visit    POSTPARTUM BLUES  During the first few days after birth, you may experience a sense of the \"blues\" which may include impatience, irritability or even crying. These feelings come and go quickly. However, as many as 1 in 10 women experience emotional symptoms known as postpartum depression.     POSTPARTUM DEPRESSION  May start as early as the second or third day after delivery or take several weeks or months to develop. Symptoms of \"blues\" are present, but are more intense: Crying spells; loss of appetite; feelings of hopelessness or loss of control; fear of touching the baby; over concern or no concern at all about the baby; little or no concern about your own appearance/caring for yourself; and/or inability to sleep or excessive sleeping. Contact your Obstetrician if you are experiencing any of these symptoms     PREVENTING SHAKEN BABY  If you are angry or stressed, PUT THE BABY IN THE CRIB, step away, take some deep breaths, and wait until you are calm to care for the baby. DO NOT SHAKE THE BABY. You are not alone, call a supporter for help.  [facility] 24/7 crisis call line ([phone]) or (1-458.197.6666)  You can also text them, text \"ANSWER\" (226599)    "

## 2022-09-19 NOTE — CARE PLAN
The patient is Stable - Low risk of patient condition declining or worsening    Shift Goals  Clinical Goals: Maintain fundus firm, lochia light; discharge home  Patient Goals: discharge    Progress made toward(s) clinical / shift goals:  MET

## 2022-09-19 NOTE — DISCHARGE PLANNING
:     Received a call from Carissa Allen with [facility] stating the Grandfather was able to help the parents by getting them into a weekly motel for a couple of weeks and the FOB will be working and will be able to continue paying the rent.  Carissa stated the FOB also drug tested and so they are clearing infant to discharge home with parents.  Carissa will be picking up the parents and taking them to the motel so she can complete the home check and set up the pack-n-play they are providing the family.   notified Greg.     Plan:  Per [facility], infant is cleared to discharge home with parents.

## 2022-09-19 NOTE — DISCHARGE PLANNING
:    MOB has a discharge order and infant will be ready for discharge today after circumcision.  Spoke with Carissa Allen with [facility] ([phone]) who has been assigned this case.  Update provided to Carissa.  Carissa stated they will likely be taking custody of infant as she does not think the [facility] allows children.  Carissa is waiting to hear back from the [facility] to confirm.  Carissa will be coming to the hospital to meet with parents.    Plan:  Continue to follow and coordinate with Carissa Allen with [facility].

## 2022-09-19 NOTE — PROGRESS NOTES
0705- Bedside report received from TRE Marquez.  Patient denied needs.  1120- Patient assessment done.  Patient reported she is voiding without difficulty and passing flatus.  Patient denied dizziness and reported she is walking without difficulty.  Patient denied pain at this time.  Reviewed plan of care.  Patient verbalized understanding.  1330- Discharge instructions reviewed with patient who verbalized understanding and stated she has no questions.  1403- Patient stated that she is ready for discharge.  Patient stated she would like to walk out to the vehicle.  Patient discharged, no change noted in condition, via wheelchair with PITA monreal, and Carissa [facility] .

## 2022-09-19 NOTE — LACTATION NOTE
This note was copied from a baby's chart.  Mother has switched to exclusive formula feeding per RN report.

## 2022-09-19 NOTE — CARE PLAN
The patient is Stable - Low risk of patient condition declining or worsening    Shift Goals  Clinical Goals: VSS    Progress made toward(s) clinical / shift goals: Patient with stable VS. Ambulating with no issues.     Patient is not progressing towards the following goals:

## 2022-09-19 NOTE — DISCHARGE SUMMARY
Discharge Summary:     Date of Admission: 2022  Date of Discharge: 22      Admitting diagnosis:    1. Pregnancy @ 36w6d  2. Limited prenatal care      Discharge Diagnosis:   1. Status post vaginal, spontaneous.  2. Limited prenatal care    Past Medical History:   Diagnosis Date    Allergy     Anxiety      OB History    Para Term  AB Living   2 2 1 1   2   SAB IAB Ectopic Molar Multiple Live Births           0 2      # Outcome Date GA Lbr Sinan/2nd Weight Sex Delivery Anes PTL Lv   2  22 36w6d 01:05 / 00:09  M Vag-Spont  N MAMADOU   1 Term 10/03/20 37w5d  2.948 kg (6 lb 8 oz) F Vag-Spont EPI N MAMADOU      Birth Comments: Pt states baby was born with heart murmur     Past Surgical History:   Procedure Laterality Date    DENTAL SURGERY      wisdom teeth, tooth reposition     Food, Kiwi extract, and Latex    Patient Active Problem List   Diagnosis    Scoliosis    History of anxiety and depression    DUB (dysfunctional uterine bleeding)    Tobacco use affecting pregnancy in first trimester, antepartum    Marijuana use    Encounter for supervision of normal pregnancy in second trimester    Velamentous insertion of umbilical cord in second trimester    Encounter for induction of labor       Hospital Course:   Pt is a 22 y.o. now  who presented on 2022 in active labor. Single male infant was delivered via  at 36w6d. Apgars 8, 8 at 1 and 5 minutes respectively.  ml.     Postpartum course notable for early ambulation, well managed pain, tolerance of diet, spontaneous voiding, and appropriate feeding of infant. She has remained afebrile and blood pressure has been well controlled. All maternal questions and concerns addressed    SW involved due to open case with [facility].     Physical Exam:  Temp:  [37 °C (98.6 °F)-37.2 °C (98.9 °F)] 37.2 °C (98.9 °F)  Pulse:  [60-64] 60  Resp:  [18] 18  BP: (104-118)/(69-72) 104/69  SpO2:  [96 %-99 %] 99 %  Physical Exam  General: well  appearing, no apparent distress  Resp: unlabored, symmetric chest rise, CTAB  CV: RRR, nl S1 and S2  Abdomen: soft, nontender, nondistended  Fundus: firm at level below umbilicus  Perineum: Deferred  Extremities: symmetric, no peripheral edema, calves nontender    Current Facility-Administered Medications   Medication Dose    oxytocin (PITOCIN) infusion (for post delivery)  125 mL/hr    lactated ringers infusion      docusate sodium (COLACE) capsule 100 mg  100 mg    ibuprofen (MOTRIN) tablet 800 mg  800 mg    acetaminophen (TYLENOL) tablet 1,000 mg  1,000 mg    tetanus-dipth-acell pertussis (Tdap) inj 0.5 mL  0.5 mL    nicotine (NICODERM) 14 MG/24HR 14 mg  14 mg       Recent Labs     09/17/22  0920 09/18/22  0516   WBC 9.9 12.3*   RBC 5.33 4.42   HEMOGLOBIN 12.4 10.1*   HEMATOCRIT 41.4 34.3*   MCV 77.7* 77.6*   MCH 23.3* 22.9*   MCHC 30.0* 29.4*   RDW 51.8* 51.9*   PLATELETCT 156* 185   MPV 10.8 11.8         Activity/ Discharge Instructions::   Discharge to home  Pelvic Rest x 6 weeks  No heavy lifting x4 weeks  Call or come to ED for: heavy vaginal bleeding, fever >100.4, severe abdominal pain, severe headache, chest pain, shortness of breath,  N/V, incisional drainage, or other concerns.  PP contraception: Undecided; patient hesitant regarding hormonal birth control options, declines at this time.     Follow up:  [facility] in 6 weeks    Discharge Meds:   Current Outpatient Medications   Medication Sig Dispense Refill    acetaminophen (TYLENOL) 500 MG Tab Take 2 Tablets by mouth every 6 hours as needed for Moderate Pain. 30 Tablet 0    docusate sodium 100 MG Cap Take 100 mg by mouth 2 times a day as needed for Constipation. 60 Capsule     ibuprofen (MOTRIN) 800 MG Tab Take 1 Tablet by mouth every 8 hours as needed for Moderate Pain or Mild Pain. 30 Tablet 0           Kia Choi M.D.

## 2022-11-10 ENCOUNTER — TELEPHONE (OUTPATIENT)
Dept: OBGYN | Facility: CLINIC | Age: 22
End: 2022-11-10
Payer: COMMERCIAL

## 2022-11-15 ENCOUNTER — APPOINTMENT (OUTPATIENT)
Dept: OBGYN | Facility: CLINIC | Age: 22
End: 2022-11-15
Payer: COMMERCIAL

## 2022-11-17 ENCOUNTER — HOSPITAL ENCOUNTER (OUTPATIENT)
Facility: MEDICAL CENTER | Age: 22
End: 2022-11-17
Payer: COMMERCIAL

## 2022-11-17 ENCOUNTER — POST PARTUM (OUTPATIENT)
Dept: OBGYN | Facility: CLINIC | Age: 22
End: 2022-11-17
Payer: COMMERCIAL

## 2022-11-17 VITALS — SYSTOLIC BLOOD PRESSURE: 94 MMHG | WEIGHT: 116.8 LBS | DIASTOLIC BLOOD PRESSURE: 46 MMHG | BODY MASS INDEX: 20.05 KG/M2

## 2022-11-17 DIAGNOSIS — Z86.59 HISTORY OF ANXIETY: ICD-10-CM

## 2022-11-17 DIAGNOSIS — N89.8 VAGINAL DISCHARGE: ICD-10-CM

## 2022-11-17 PROBLEM — O43.122 VELAMENTOUS INSERTION OF UMBILICAL CORD IN SECOND TRIMESTER: Status: RESOLVED | Noted: 2022-06-01 | Resolved: 2022-11-17

## 2022-11-17 PROBLEM — Z34.90 ENCOUNTER FOR INDUCTION OF LABOR: Status: RESOLVED | Noted: 2022-09-17 | Resolved: 2022-11-17

## 2022-11-17 PROBLEM — N93.8 DUB (DYSFUNCTIONAL UTERINE BLEEDING): Status: RESOLVED | Noted: 2022-04-25 | Resolved: 2022-11-17

## 2022-11-17 PROBLEM — Z34.92 ENCOUNTER FOR SUPERVISION OF NORMAL PREGNANCY IN SECOND TRIMESTER: Status: RESOLVED | Noted: 2022-05-09 | Resolved: 2022-11-17

## 2022-11-17 PROBLEM — Z72.0 TOBACCO USE: Status: ACTIVE | Noted: 2022-04-25

## 2022-11-17 LAB
APPEARANCE UR: CLEAR
BILIRUB UR STRIP-MCNC: NORMAL MG/DL
COLOR UR AUTO: NORMAL
GLUCOSE UR STRIP.AUTO-MCNC: NEGATIVE MG/DL
KETONES UR STRIP.AUTO-MCNC: NEGATIVE MG/DL
LEUKOCYTE ESTERASE UR QL STRIP.AUTO: NEGATIVE
NITRITE UR QL STRIP.AUTO: NEGATIVE
PH UR STRIP.AUTO: 5.5 [PH] (ref 5–8)
PROT UR QL STRIP: NEGATIVE MG/DL
RBC UR QL AUTO: NORMAL
SP GR UR STRIP.AUTO: 1.02
UROBILINOGEN UR STRIP-MCNC: NORMAL MG/DL

## 2022-11-17 PROCEDURE — 88175 CYTOPATH C/V AUTO FLUID REDO: CPT

## 2022-11-17 PROCEDURE — 87591 N.GONORRHOEAE DNA AMP PROB: CPT

## 2022-11-17 PROCEDURE — 87491 CHLMYD TRACH DNA AMP PROBE: CPT

## 2022-11-17 PROCEDURE — 81002 URINALYSIS NONAUTO W/O SCOPE: CPT

## 2022-11-17 PROCEDURE — 0503F POSTPARTUM CARE VISIT: CPT

## 2022-11-17 RX ORDER — LEVONORGESTREL AND ETHINYL ESTRADIOL 0.1-0.02MG
1 KIT ORAL DAILY
Qty: 84 TABLET | Refills: 3 | Status: SHIPPED | OUTPATIENT
Start: 2022-11-17

## 2022-11-17 RX ORDER — ACETAMINOPHEN AND CODEINE PHOSPHATE 120; 12 MG/5ML; MG/5ML
1 SOLUTION ORAL DAILY
Qty: 84 TABLET | Refills: 3 | Status: SHIPPED | OUTPATIENT
Start: 2022-11-17 | End: 2022-11-17 | Stop reason: CLARIF

## 2022-11-17 ASSESSMENT — EDINBURGH POSTNATAL DEPRESSION SCALE (EPDS)
I HAVE BLAMED MYSELF UNNECESSARILY WHEN THINGS WENT WRONG: NOT VERY OFTEN
THINGS HAVE BEEN GETTING ON TOP OF ME: NO, MOST OF THE TIME I HAVE COPED QUITE WELL
I HAVE LOOKED FORWARD WITH ENJOYMENT TO THINGS: AS MUCH AS I EVER DID
I HAVE FELT SCARED OR PANICKY FOR NO GOOD REASON: NO, NOT AT ALL
I HAVE BEEN ABLE TO LAUGH AND SEE THE FUNNY SIDE OF THINGS: AS MUCH AS I ALWAYS COULD
I HAVE FELT SAD OR MISERABLE: NO, NOT AT ALL
I HAVE BEEN ANXIOUS OR WORRIED FOR NO GOOD REASON: NO, NOT AT ALL
THE THOUGHT OF HARMING MYSELF HAS OCCURRED TO ME: NEVER
TOTAL SCORE: 3
I HAVE BEEN SO UNHAPPY THAT I HAVE HAD DIFFICULTY SLEEPING: NOT AT ALL
I HAVE BEEN SO UNHAPPY THAT I HAVE BEEN CRYING: ONLY OCCASIONALLY

## 2022-11-17 NOTE — PROGRESS NOTES
Subjective   Subjective:    Nena Mcintosh is a 22 y.o.  female who presents for her postpartum exam. She had a  without complication. Her prenatal course was c/b very limited PNC, tobacco and marijuana use. Eating a regular diet without difficulty. Bowel movement are Normal.  The patient is not having any pain. Vaginal bleeding: has resolved with resumption of menses - LMP 22. She is formula feeding. She desires pills for her birth control method. Reports resumption of sex prior to this appointment - used the pull out method for contraception.  Denies any S/S of PP depression. Reviewed medical history and identified no contraindications to NICK use.     Objective   Objective:      Objective : The patient appears well, alert and oriented x 3, in no acute distress.  BP (!) 94/46   Wt 116 lb 12.8 oz     HEENT Exam: EOMI, pupils equal and round, no adenopathy or thyromegaly.  Lungs: Clear to Auscultation   Cardiac: S1 and S2 normal, no murmurs, or rubs   Abdomen: Soft without tenderness, guarding mass or organomegaly. No diastasis  Extremities: No edema, pulses equal  Neurological: Normal, No focal signs  Breast Exam:declines  Pelvic: External genitalia,urethral meatus, urethra, bladder and vagina normal. Cervix, uterus and adnexa intact and normal.  Anus and perineum normal. Small amount of menstrual blood in the vault and at the cervix.   Pap: Due today, collected  Wet mount negative for trich, BV, or yeast    Lab:  Recent Results (from the past 336 hour(s))   POCT Urinalysis    Collection Time: 22  1:31 PM   Result Value Ref Range    POC Color Dark Yellow Negative    POC Appearance Clear Negative    POC Leukocyte Esterase Negative Negative    POC Nitrites Negative Negative    POC Urobiligen n/a Negative (0.2) mg/dL    POC Protein Negative Negative mg/dL    POC Urine PH 5.5 5.0 - 8.0    POC Blood Small Negative    POC Specific Gravity 1.025 <1.005 - >1.030    POC Ketones Negative Negative  mg/dL    POC Bilirubin n/a Negative mg/dL    POC Glucose Negative Negative mg/dL       Assessment   Assessment:    1. PP care and examination   2. Exam WNL   3. Pap collected today, will follow up per ASCCP guidelines  4. Desires contraception: POPs  5. EPDS score: 3  [place]  Depression Scale  I have been able to laugh and see the funny side of things.: As much as I always could  I have looked forward with enjoyment to things.: As much as I ever did  I have blamed myself unnecessarily when things went wrong.: Not very often  I have been anxious or worried for no good reason.: No, not at all  I have felt scared or panicky for no good reason.: No, not at all  Things have been getting on top of me.: No, most of the time I have coped quite well  I have been so unhappy that I have had difficulty sleeping.: Not at all  I have felt sad or miserable.: No, not at all  I have been so unhappy that I have been crying.: Only occasionally  The thought of harming myself has occurred to me.: Never  Trevett  Depression Scale Total: 3      Patient Active Problem List    Diagnosis Date Noted    Tobacco use 2022    Marijuana use 2022    History of anxiety and depression 2020    Scoliosis 2014       Plan   Plan:    1. Breastfeeding support   2. Continue PNV   3. Contraceptive counseling - COCs Rx today per patient preference  4. Encouraged condom use for STI and pregnancy prevention  5. Discussed diet, exercise and resumption of sexual activity   6. Will review pap results and follow up with recommendations as per ASCCP guidelines  7.  F/u c PCP or [facility] clinic as needed for primary care needs.       Luli Berry C.N.M.

## 2022-11-17 NOTE — PROGRESS NOTES
Pt here today for postpartum exam.  Delivery date: 09/17/2022  Formula feeding  BCM: would like the pill   LMP: 11/13/2022  WT: 116.8 lb   BP: 94/46  Pt states has been having lower abdominal pain and a little pain with voiding x 2 weeks.   Good ph [phone]    EPDS Score: 3

## 2022-11-18 LAB
C TRACH DNA GENITAL QL NAA+PROBE: NEGATIVE
CYTOLOGY REG CYTOL: NORMAL
N GONORRHOEA DNA GENITAL QL NAA+PROBE: NEGATIVE
SPECIMEN SOURCE: NORMAL

## 2023-10-10 ENCOUNTER — GYNECOLOGY VISIT (OUTPATIENT)
Dept: OBGYN | Facility: CLINIC | Age: 23
End: 2023-10-10
Payer: COMMERCIAL

## 2023-10-10 VITALS — DIASTOLIC BLOOD PRESSURE: 56 MMHG | BODY MASS INDEX: 17.71 KG/M2 | WEIGHT: 103.2 LBS | SYSTOLIC BLOOD PRESSURE: 90 MMHG

## 2023-10-10 DIAGNOSIS — Z30.017 NEXPLANON INSERTION: ICD-10-CM

## 2023-10-10 PROCEDURE — 3074F SYST BP LT 130 MM HG: CPT | Performed by: OBSTETRICS & GYNECOLOGY

## 2023-10-10 PROCEDURE — 3078F DIAST BP <80 MM HG: CPT | Performed by: OBSTETRICS & GYNECOLOGY

## 2023-10-10 PROCEDURE — 11981 INSERTION DRUG DLVR IMPLANT: CPT | Mod: GC | Performed by: OBSTETRICS & GYNECOLOGY

## 2023-10-10 NOTE — PROCEDURES
24 yo  female presents for Nexplanon insertion. Risks, benefits, side effects and alternatives explained. Informed consent signed.    PRE-OP DIAGNOSIS: desired long-term, reversible contraception   POST-OP DIAGNOSIS: Same   PROCEDURE: Nexplanon ® placement  Performing Physician: Kevyn Franklin MD  Supervising Physician (if applicable): Kassy Lou MD     PROCEDURE:   Site (check):       [_]      Right Arm        [x]     Left Arm        Serial # [id number] LOT [id number]    Sterile Preparation:    [x]      Betadine        [_]     Chloraprep          Expiration Date [2025]     Insertion site was selected 8 - 10 cm from medial epicondyle and marked along with guiding site using sterile marker  Procedure area was prepped and draped in a sterile fashion. 2-3 mL of  1% lidocaine with  epinephrine used for subcutaneous anesthesia. Anesthesia confirmed.   Nexplanon ® trocar was inserted subcutaneously and then Nexplanon ® capsule delivered subcutaneously  Trocar was removed from the insertion site.  Nexplanon ® capsule was palpated by provider and patient to assure satisfactory placement.  Estimated blood loss of <5  mL.  Dressings applied.    Followup: The patient tolerated the procedure well without  complications.  Standard post-procedure care is explained and return  precautions are given.      Kevyn Franklin MD  [facility]  PGY-3

## 2023-10-10 NOTE — PROGRESS NOTES
Pt here for Neplanon insertion   LMP 10/7/2023  Last pap smear 11/18/2022 WNL  Pt states has been spotting even on the birth control pills was told if had Nexplanon placed could stop her period completely.

## 2023-10-20 ENCOUNTER — HOSPITAL ENCOUNTER (EMERGENCY)
Facility: MEDICAL CENTER | Age: 23
End: 2023-10-20
Attending: EMERGENCY MEDICINE
Payer: COMMERCIAL

## 2023-10-20 VITALS
SYSTOLIC BLOOD PRESSURE: 123 MMHG | HEART RATE: 84 BPM | TEMPERATURE: 98 F | DIASTOLIC BLOOD PRESSURE: 82 MMHG | HEIGHT: 64 IN | BODY MASS INDEX: 17.01 KG/M2 | OXYGEN SATURATION: 95 % | WEIGHT: 99.65 LBS | RESPIRATION RATE: 14 BRPM

## 2023-10-20 DIAGNOSIS — K02.9 INFECTED DENTAL CARIES: ICD-10-CM

## 2023-10-20 DIAGNOSIS — K04.7 INFECTED DENTAL CARIES: ICD-10-CM

## 2023-10-20 DIAGNOSIS — K08.89 DENTALGIA: ICD-10-CM

## 2023-10-20 PROCEDURE — 99281 EMR DPT VST MAYX REQ PHY/QHP: CPT

## 2023-10-20 RX ORDER — AMOXICILLIN AND CLAVULANATE POTASSIUM 875; 125 MG/1; MG/1
1 TABLET, FILM COATED ORAL 2 TIMES DAILY
Qty: 14 TABLET | Refills: 0 | Status: ACTIVE | OUTPATIENT
Start: 2023-10-20 | End: 2023-10-27

## 2023-10-20 NOTE — ED PROVIDER NOTES
"ED Provider Note    CHIEF COMPLAINT  Chief Complaint   Patient presents with    Dental Pain     X 4 days; c/o pain that radiates to ear, neck and is \"giving me migraines\"       EXTERNAL RECORDS REVIEWED  Other noncontributory    HPI/ROS  LIMITATION TO HISTORY   Select: : None  OUTSIDE HISTORIAN(S):  None    Nena Stephani Mcintosh is a 23 y.o. female who presents to the emergency department through triage for dental pain.  Patient describes 3 to 4 days of left-sided dental pain, known broken tooth, left upper molar.  Patient has an appointment with [facility] in [facility] but pain has worsened this week.  No oral or facial swelling.  No difficulty swallowing or breathing.  She does have pain with fluids and mastication.  Left-sided dental pain radiating to the ear.  No fever or chills.    PAST MEDICAL HISTORY   has a past medical history of Allergy and Anxiety.    SURGICAL HISTORY   has a past surgical history that includes dental surgery (2018).    FAMILY HISTORY  Family History   Problem Relation Age of Onset    No Known Problems Mother     No Known Problems Father     Cancer Maternal Grandmother         breast       SOCIAL HISTORY  Social History     Tobacco Use    Smoking status: Every Day     Types: Cigarettes    Smokeless tobacco: Never    Tobacco comments:     Pt states smoking 2-3 cig/day    Vaping Use    Vaping Use: Some days   Substance and Sexual Activity    Alcohol use: No    Drug use: Yes     Types: Marijuana     Comment: smoking occasionally     Sexual activity: Yes     Partners: Male     Birth control/protection: None       CURRENT MEDICATIONS  Home Medications       Reviewed by Laura Calderon R.N. (Registered Nurse) on 10/20/23 at 0748  Med List Status: Not Addressed     Medication Last Dose Status   acetaminophen (TYLENOL) 500 MG Tab  Active   docusate sodium 100 MG Cap  Active   ibuprofen (MOTRIN) 800 MG Tab  Active   levonorgestrel-ethinyl estradiol (AVIANE) 0.1-20 MG-MCG per tablet  Active " "  Prenatal Vit-Fe Fumarate-FA (PRENATAL VITAMINS) 28-0.8 MG Tab  Active                    ALLERGIES  Allergies   Allergen Reactions    Food Swelling     Pt states when she eats kiwi her throat get tingly     Kiwi Extract     Latex      Pt states gets a rash and swollen       PHYSICAL EXAM  VITAL SIGNS: /82   Pulse 84   Temp 36.7 °C (98 °F) (Temporal)   Resp 14   Ht 1.626 m (5' 4\")   Wt 45.2 kg (99 lb 10.4 oz)   LMP 10/07/2023   SpO2 95%   BMI 17.10 kg/m²    Pulse ox interpretation: I interpret this pulse ox as normal.  Constitutional: Alert in no apparent distress.  HENT: Normocephalic, atraumatic. Bilateral external ears normal, Nose normal. Moist mucous membranes.  Oropharynx within normal limits, erythema, edema or exudate.  Obvious dental fracture, dental carry left upper rear molar, tender to percussion without gingival swelling or fluctuance.  No facial swelling or cellulitis.  No lingual elevation.  Tolerating secretions.  No stridor or dysphonia.  Eyes: Pupils are equal and reactive, Conjunctiva normal.   Neck: Normal range of motion, Supple.  No cervical or submandibular lymphadenopathy  Lymphatic: No lymphadenopathy noted.   Cardiovascular: Normal peripheral perfusion  Thorax & Lungs: Nonlabored respirations  Skin: Warm, Dry  Musculoskeletal: Good range of motion in all major joints.  Neurologic: Alert and oriented x4.  Speech clear and cohesive  Psychiatric: Affect normal, Judgment normal, Mood normal.       COURSE & MEDICAL DECISION MAKING    ED Observation Status? No; Patient does not meet criteria for ED Observation.     INITIAL ASSESSMENT, COURSE AND PLAN  Care Narrative:   ED evaluation for dental pain most suggestive of infected dental caries, cannot exclude apical dental abscess.  No evidence for gingival abscess or other oral or airway complication.  No facial cellulitis.  No meningitis.  No Ludwigs.  Hemodynamically stable without fever or tachycardia.  Tolerating oral fluids " without difficulty.  Stable for outpatient antibiotic management and close dental follow-up as planned    ADDITIONAL PROBLEM LIST  Denies  DISPOSITION AND DISCUSSIONS    Escalation of care considered, and ultimately not performed:diagnostic imaging and acute inpatient care management, however at this time, the patient is most appropriate for outpatient management    Decision tools and prescription drugs considered including, but not limited to: Pain Medications acetaminophen, ibuprofen appropriate in the setting .    The patient is stable for discharge home, anticipatory guidance provided, Augmentin for 7 days, Tylenol and ibuprofen as needed for discomfort, diet modifications discussed, close follow-up is encouraged and strict return instructions have been discussed. Patient is agreeable to the disposition and plan.      FINAL DIAGNOSIS  1. Dentalgia    2. Infected dental caries           Electronically signed by: Ashley Macias D.O., 10/20/2023 8:37 AM

## 2023-10-20 NOTE — ED NOTES
Pt states she thinks she has a tooth infection. Pt states she has had tooth pain for approximately 4 days that radiates to her jaw, throat and ear. Pt states she has been vomittiing. Pt states she has been having migraines.

## 2023-10-20 NOTE — ED NOTES
Pt given d/c paperwork and RX p/u information; pt verbalized understanding all information given. Pt ambulated out of the ER w/o difficulty

## 2023-10-20 NOTE — DISCHARGE INSTRUCTIONS
Follow-up with a dentist as scheduled in November unless earlier appointment can be made for reevaluation and definitive treatment.    Augmentin twice daily for 1 week for infected dental caries.  Tylenol/acetaminophen and ibuprofen/Motrin/Advil, alternating if needed, as needed for discomfort.    Room temperature, soft diet as tolerated.  Encourage oral fluid hydration.    Return to the emergency department for persistent worsening dental pain, oral or facial swelling, difficulty swallowing or breathing, change in voice, neck pain or stiffness, fever, vomiting or other new concerns  
Detail Level: Detailed
Detail Level: Zone

## 2023-10-20 NOTE — ED NOTES
Pain is on L side; pt states she broke her back tooth about a month ago; has been using ibuprofen for pain control    Pt states she has tried to get in w/ dentist but they are over a month out

## 2023-10-20 NOTE — ED TRIAGE NOTES
"Chief Complaint   Patient presents with    Dental Pain     X 4 days; c/o pain that radiates to ear, neck and is \"giving me migraines\"     /82   Pulse 84   Temp 36.7 °C (98 °F) (Temporal)   Resp 14   Ht 1.626 m (5' 4\")   Wt 45.2 kg (99 lb 10.4 oz)   LMP 10/07/2023   SpO2 95%   BMI 17.10 kg/m²     "

## 2023-11-13 ENCOUNTER — GYNECOLOGY VISIT (OUTPATIENT)
Dept: OBGYN | Facility: CLINIC | Age: 23
End: 2023-11-13
Payer: COMMERCIAL

## 2023-11-13 VITALS — WEIGHT: 103 LBS | BODY MASS INDEX: 17.68 KG/M2 | DIASTOLIC BLOOD PRESSURE: 73 MMHG | SYSTOLIC BLOOD PRESSURE: 109 MMHG

## 2023-11-13 DIAGNOSIS — Z97.5 NEXPLANON IN PLACE: ICD-10-CM

## 2023-11-13 PROCEDURE — 99212 OFFICE O/P EST SF 10 MIN: CPT | Performed by: OBSTETRICS & GYNECOLOGY

## 2023-11-13 PROCEDURE — 3078F DIAST BP <80 MM HG: CPT | Performed by: OBSTETRICS & GYNECOLOGY

## 2023-11-13 PROCEDURE — 3074F SYST BP LT 130 MM HG: CPT | Performed by: OBSTETRICS & GYNECOLOGY

## 2023-11-13 NOTE — PROGRESS NOTES
GYN visit    CC/reason for visit: Nexplanon check    HPI: Nena Mcintosh is a 23 y.o.  with Nexplanon insertion performed on 10/10/2023.  Initially patient did have migraines and some abnormal bleeding but states that the bleeding has subsided and the headaches have improved.  She does not have any incisional site tenderness.  No fevers or chills.  No abnormal bleeding.    ROS:  Reviewed and negative x 10 with pertinent positives listed in HPI above        GYN History:   no h/o abnormal pap, nor history of cone biopsy, LEEP or any other cervical, uterine or gynecologic surgery. No history of sexually transmitted diseases.       OB history:    OB History    Para Term  AB Living   2 2 1 1   2   SAB IAB Ectopic Molar Multiple Live Births           0 2      # Outcome Date GA Lbr Sinan/2nd Weight Sex Delivery Anes PTL Lv   2  22 36w6d 01:05 / 00:09  M Vag-Spont  N MAMADOU   1 Term 10/03/20 37w5d  6 lb 8 oz F Vag-Spont EPI N MAMADOU      Birth Comments: Pt states baby was born with heart murmur       Past Medical History:   Diagnosis Date    Allergy     Anxiety        Past Surgical History:   Procedure Laterality Date    DENTAL SURGERY  2018    wisdom teeth, tooth reposition       Medications:   Current Outpatient Medications Ordered in Epic   Medication Sig Dispense Refill    levonorgestrel-ethinyl estradiol (AVIANE) 0.1-20 MG-MCG per tablet Take 1 Tablet by mouth every day. 84 Tablet 3    acetaminophen (TYLENOL) 500 MG Tab Take 2 Tablets by mouth every 6 hours as needed for Moderate Pain. (Patient not taking: Reported on 2022) 30 Tablet 0    docusate sodium 100 MG Cap Take 100 mg by mouth 2 times a day as needed for Constipation. (Patient not taking: Reported on 2022) 60 Capsule     ibuprofen (MOTRIN) 800 MG Tab Take 1 Tablet by mouth every 8 hours as needed for Moderate Pain or Mild Pain. (Patient not taking: Reported on 2022) 30 Tablet 0    Prenatal Vit-Fe Fumarate-FA  (PRENATAL VITAMINS) 28-0.8 MG Tab Take 1 Tablet by mouth every day. (Patient not taking: Reported on 2022) 90 Tablet 5     No current Epic-ordered facility-administered medications on file.       Allergies: Food, Kiwi extract, and Latex    Social History     Socioeconomic History    Marital status: Single   Tobacco Use    Smoking status: Every Day     Types: Cigarettes    Smokeless tobacco: Never    Tobacco comments:     Pt states smoking 2-3 cig/day    Vaping Use    Vaping Use: Some days   Substance and Sexual Activity    Alcohol use: No    Drug use: Yes     Types: Marijuana     Comment: smoking occasionally     Sexual activity: Yes     Partners: Male     Birth control/protection: None       Family History   Problem Relation Age of Onset    No Known Problems Mother     No Known Problems Father     Cancer Maternal Grandmother         breast         Physical Exam:  /73 (BP Location: Left arm, Patient Position: Sitting, BP Cuff Size: Adult)   Wt 103 lb   LMP 2023 (Approximate)   Breastfeeding No   BMI 17.68 kg/m²   gen: AAO, NAD, affect appropriate  CV: No edema, cyanosis, or clubbing  Resp: Symmetric non labored breathing  Abd: soft, NT, ND, no masses, no organomegaly, no hernias  Skin: warm/dry, no lesions  EXT: Nexplanon noted to be in the correct location in the left upper extremity    A/P: 23 y.o.  with   1. Nexplanon in place